# Patient Record
Sex: MALE | Race: WHITE | Employment: PART TIME | URBAN - METROPOLITAN AREA
[De-identification: names, ages, dates, MRNs, and addresses within clinical notes are randomized per-mention and may not be internally consistent; named-entity substitution may affect disease eponyms.]

---

## 2020-11-04 ENCOUNTER — APPOINTMENT (OUTPATIENT)
Dept: GENERAL RADIOLOGY | Age: 35
End: 2020-11-04
Attending: NURSE PRACTITIONER
Payer: MEDICAID

## 2020-11-04 ENCOUNTER — HOSPITAL ENCOUNTER (EMERGENCY)
Age: 35
Discharge: HOME | End: 2020-11-04
Payer: MEDICAID

## 2020-11-04 VITALS
WEIGHT: 185 LBS | BODY MASS INDEX: 27.4 KG/M2 | DIASTOLIC BLOOD PRESSURE: 87 MMHG | SYSTOLIC BLOOD PRESSURE: 145 MMHG | HEART RATE: 77 BPM | OXYGEN SATURATION: 100 % | RESPIRATION RATE: 16 BRPM | HEIGHT: 69 IN | TEMPERATURE: 97.9 F

## 2020-11-04 DIAGNOSIS — S02.2XXA CLOSED FRACTURE OF NASAL BONE, INITIAL ENCOUNTER: Primary | ICD-10-CM

## 2020-11-04 PROCEDURE — 99282 EMERGENCY DEPT VISIT SF MDM: CPT

## 2020-11-04 PROCEDURE — 70160 X-RAY EXAM OF NASAL BONES: CPT

## 2020-11-04 RX ORDER — HYDROCODONE BITARTRATE AND ACETAMINOPHEN 5; 325 MG/1; MG/1
1 TABLET ORAL
Qty: 10 TAB | Refills: 0 | Status: SHIPPED | OUTPATIENT
Start: 2020-11-04 | End: 2020-11-07
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
## 2020-11-04 NOTE — ED TRIAGE NOTES
Pt states he was wrestling around 3 days pta and was elbowed in the nose, complains of pain to the nose since then, states some bleeding yesterday however none since

## 2020-11-04 NOTE — Clinical Note
[address] [place] [address] EMERGENCY DEPT 
[facility] [address] [id number] 
[phone] Work/School Note Date: 11/4/2020 To Whom It May concern: 
 
 
Hood Nuno was seen and treated today in the emergency room by the following provider(s): 
Nurse Practitioner: Ansley Marquez NP. Hood Nuno is excused from work/school on 11/04/20. He is clear to return to work/school on 11/05/20. Sincerely, Nirali Lowe NP

## 2020-11-04 NOTE — ED NOTES
[facility] 15 all medications, side effects and follow up explained with pt's verbal understanding given.  All personal belongings taken

## 2020-11-06 ENCOUNTER — OFFICE VISIT (OUTPATIENT)
Dept: ENT CLINIC | Age: 35
End: 2020-11-06
Payer: MEDICAID

## 2020-11-06 VITALS
TEMPERATURE: 97.8 F | HEART RATE: 74 BPM | SYSTOLIC BLOOD PRESSURE: 138 MMHG | OXYGEN SATURATION: 98 % | DIASTOLIC BLOOD PRESSURE: 80 MMHG

## 2020-11-06 DIAGNOSIS — R51.9 ACUTE NONINTRACTABLE HEADACHE, UNSPECIFIED HEADACHE TYPE: ICD-10-CM

## 2020-11-06 DIAGNOSIS — S02.2XXA CLOSED FRACTURE OF NASAL BONE, INITIAL ENCOUNTER: Primary | ICD-10-CM

## 2020-11-06 PROCEDURE — 99203 OFFICE O/P NEW LOW 30 MIN: CPT | Performed by: OTOLARYNGOLOGY

## 2020-11-06 NOTE — PROGRESS NOTES
Subjective:    [place] Code   35 y.o.   1985     HPI     Location - nose, head    Quality - nasal fracture, headache    Severity -  moderate    Duration - 5 days    Timing - ongoing    Context - pt suffered acute nasal trauma wrestling with his son, 5 days ago, had right epistaxis, some bruising; still co headaches but no congestion; does not c/o appearance of nose    Modifying Features - ice helps    Associated symptoms/signs - headache       Review of Systems  Review of Systems   Constitutional: Negative for chills and fever. HENT: Positive for nosebleeds. Negative for ear pain, hearing loss and tinnitus. Eyes: Negative for blurred vision and double vision. Respiratory: Negative for cough, sputum production and shortness of breath. Cardiovascular: Negative for chest pain and palpitations. Gastrointestinal: Negative for heartburn, nausea and vomiting. Musculoskeletal: Negative for joint pain and neck pain. Skin: Negative. Neurological: Positive for headaches. Negative for dizziness, speech change and weakness. Endo/Heme/Allergies: Negative for environmental allergies. Does not bruise/bleed easily. Psychiatric/Behavioral: Negative for memory loss. The patient does not have insomnia. History reviewed. No pertinent past medical history. History reviewed. No pertinent surgical history. History reviewed. No pertinent family history. Social History     Tobacco Use    Smoking status: Current Every Day Smoker    Smokeless tobacco: Never Used   Substance Use Topics    Alcohol use: Yes     Comment: occasionally      Prior to Admission medications    Medication Sig Start Date End Date Taking? Authorizing Provider   HYDROcodone-acetaminophen (Norco) 5-325 mg per tablet Take 1 Tab by mouth every six (6) hours as needed for Pain for up to 3 days. Max Daily Amount: 4 Tabs.  11/4/20 11/7/20  Severiano Dearth, NP        No Known Allergies      Objective:     Visit Vitals  /80 (BP 1 Location: Right arm, BP Patient Position: Sitting)   Pulse 74   Temp 97.8 °F (36.6 °C) (Temporal)   SpO2 98%        Physical Exam  Vitals signs reviewed. Constitutional:       General: He is awake. Appearance: Normal appearance. He is normal weight. HENT:      Head: Normocephalic. Abrasion and contusion present. Jaw: There is normal jaw occlusion. No trismus, tenderness or malocclusion. Salivary Glands: Right salivary gland is not diffusely enlarged or tender. Left salivary gland is not diffusely enlarged or tender. Right Ear: Hearing, tympanic membrane, ear canal and external ear normal.      Left Ear: Hearing, tympanic membrane, ear canal and external ear normal.      Ears:      Marino exam findings: does not lateralize. Right Rinne: AC > BC. Left Rinne: AC > BC. Nose: Mucosal edema present. No septal deviation or rhinorrhea. Right Turbinates: Not enlarged, swollen or pale. Left Turbinates: Not enlarged, swollen or pale. Right Sinus: No maxillary sinus tenderness or frontal sinus tenderness. Left Sinus: No maxillary sinus tenderness or frontal sinus tenderness. Comments: Mild dorsal hump from edema  Small abrasion midline dorsum     Mouth/Throat:      Lips: Pink. Mouth: Mucous membranes are moist. No oral lesions. Dentition: Normal dentition. No gum lesions. Tongue: No lesions. Palate: No mass and lesions. Pharynx: Oropharynx is clear. Uvula midline. Tonsils: No tonsillar exudate. 0 on the right. 0 on the left. Eyes:      General: Vision grossly intact. Extraocular Movements: Extraocular movements intact. Right eye: No nystagmus. Left eye: No nystagmus. Pupils: Pupils are equal, round, and reactive to light. Neck:      Musculoskeletal: Normal range of motion. No edema or erythema. Thyroid: No thyroid mass, thyromegaly or thyroid tenderness.       Trachea: Trachea and phonation normal. No tracheal tenderness. Cardiovascular:      Rate and Rhythm: Normal rate and regular rhythm. Pulmonary:      Effort: Pulmonary effort is normal.      Breath sounds: Normal breath sounds. No stridor. No wheezing. Musculoskeletal: Normal range of motion. Lymphadenopathy:      Cervical: No cervical adenopathy. Skin:     General: Skin is warm and dry. Neurological:      General: No focal deficit present. Mental Status: He is alert and oriented to person, place, and time. Mental status is at baseline. Cranial Nerves: Cranial nerves are intact. Coordination: Romberg sign negative. Gait: Gait is intact. Comments: Negative Hallpike   Psychiatric:         Mood and Affect: Mood normal.         Behavior: Behavior normal. Behavior is cooperative. Assessment/Plan:     Encounter Diagnoses   Name Primary?  Closed fracture of nasal bone, initial encounter Yes    Acute nonintractable headache, unspecified headache type      I reviewed his xrays from [place]  There is a small nasal tip fx  No lateral bone fx noted  He does not require closed reduction  Recommend Ice, advil    No orders of the defined types were placed in this encounter. Follow-up and Dispositions    · Return if symptoms worsen or fail to improve.

## 2021-12-10 ENCOUNTER — OFFICE VISIT (OUTPATIENT)
Dept: FAMILY MEDICINE CLINIC | Age: 36
End: 2021-12-10
Payer: MEDICAID

## 2021-12-10 VITALS
WEIGHT: 218 LBS | HEIGHT: 69 IN | HEART RATE: 76 BPM | TEMPERATURE: 97.7 F | DIASTOLIC BLOOD PRESSURE: 89 MMHG | OXYGEN SATURATION: 97 % | BODY MASS INDEX: 32.29 KG/M2 | SYSTOLIC BLOOD PRESSURE: 140 MMHG

## 2021-12-10 DIAGNOSIS — R06.02 SHORTNESS OF BREATH: ICD-10-CM

## 2021-12-10 DIAGNOSIS — Z13.0 SCREENING FOR DEFICIENCY ANEMIA: ICD-10-CM

## 2021-12-10 DIAGNOSIS — Z13.220 SCREENING FOR LIPID DISORDERS: ICD-10-CM

## 2021-12-10 DIAGNOSIS — Z13.1 SCREENING FOR DIABETES MELLITUS: ICD-10-CM

## 2021-12-10 DIAGNOSIS — Z11.59 ENCOUNTER FOR HEPATITIS C SCREENING TEST FOR LOW RISK PATIENT: ICD-10-CM

## 2021-12-10 DIAGNOSIS — Z13.21 ENCOUNTER FOR VITAMIN DEFICIENCY SCREENING: ICD-10-CM

## 2021-12-10 DIAGNOSIS — R03.0 ELEVATED BLOOD-PRESSURE READING, WITHOUT DIAGNOSIS OF HYPERTENSION: Primary | ICD-10-CM

## 2021-12-10 PROCEDURE — 99204 OFFICE O/P NEW MOD 45 MIN: CPT | Performed by: NURSE PRACTITIONER

## 2021-12-10 RX ORDER — ALBUTEROL SULFATE 90 UG/1
1 AEROSOL, METERED RESPIRATORY (INHALATION)
Qty: 18 G | Refills: 1 | Status: SHIPPED | OUTPATIENT
Start: 2021-12-10

## 2021-12-10 NOTE — PROGRESS NOTES
Chief Complaint   Patient presents with    New Patient     hasnt seen a dr in a while.  Other     c/o being dx with COVID twice. Since then he has been having chest pain. Visit Vitals  BP (!) 140/89 (BP 1 Location: Left upper arm, BP Patient Position: Sitting)   Pulse 76   Temp 97.7 °F (36.5 °C) (Temporal)   Ht 5' 9\" (1.753 m)   Wt 218 lb (98.9 kg)   SpO2 97%   BMI 32.19 kg/m²     Subjective  Irma Kat is a 31 y.o. male. HPI:  Pt presented to establish. He stated it has been awhile since he saw a medical provider. Had Covid 19 x 2 and since then has been experiencing chest pain in center of his chest and hacking up yellowish grey sputum. The coughing is not prolonged. No fever or chills. Sometimes has SOB and can hear wheezing w/ the cough. B/P in office elevated @ 140/89. He has no idea what his B/P might run outside of a medical office. He does not have a B/P kit at home to check. Pt stated that he has a torn L rotator cuff but has not pursued treatment at this time. Pt stated that he used to be a smoke and smoked 1.5 packs daily x 17 years. Quit date was 8/5/2021. He has never seen a pulmonologist or been checked for COPD. He has not had routine labs done in awhile so will order today. Patient Active Problem List   Diagnosis Code    Elevated blood-pressure reading, without diagnosis of hypertension R03.0    Shortness of breath R06.02     History reviewed. No pertinent past medical history.   Past Surgical History:   Procedure Laterality Date    HX ORTHOPAEDIC Left     ankle due to MRSA infection     Current Outpatient Medications   Medication Instructions    albuterol (PROVENTIL HFA, VENTOLIN HFA, PROAIR HFA) 90 mcg/actuation inhaler 1 Puff, Inhalation, EVERY 4-6 HOURS PRN     No Known Allergies  Social History     Tobacco Use    Smoking status: Former Smoker     Packs/day: 1.50     Years: 17.00     Pack years: 25.50     Types: Cigarettes     Quit date: 8/5/2021     Years since quittin.3    Smokeless tobacco: Never Used   Vaping Use    Vaping Use: Every day    Substances: Using disposable nicotine e-cigarettes. Substance Use Topics    Alcohol use: Yes     Comment: occasionally    Drug use: Not on file     Family History   Problem Relation Age of Onset    Cancer Mother         cervical    Hypertension Father     Hypertension Maternal Aunt     Hypertension Maternal Grandmother     Cancer Maternal Grandfather         unsure of type       Review of Systems   Constitutional: Negative for chills and fever. HENT: Negative for ear pain and sore throat. Respiratory: Positive for cough, shortness of breath and wheezing. Cardiovascular: Positive for chest pain. Center of chest   Gastrointestinal: Negative for abdominal pain, blood in stool, constipation, diarrhea, heartburn, nausea and vomiting. Genitourinary: Negative for dysuria. Musculoskeletal: Positive for back pain and joint pain. Negative for myalgias. L torn rotator cuff. Neurological: Negative for dizziness, tingling, sensory change and headaches. Endo/Heme/Allergies: Positive for environmental allergies. Psychiatric/Behavioral: Negative for depression. The patient is nervous/anxious. The patient does not have insomnia. Objective  Physical Exam  Vitals reviewed. Constitutional:       General: He is not in acute distress. Appearance: Normal appearance. HENT:      Head: Normocephalic. Right Ear: External ear normal.      Left Ear: External ear normal.      Nose: Nose normal.   Eyes:      Conjunctiva/sclera: Conjunctivae normal.   Neck:      Vascular: No carotid bruit. Cardiovascular:      Rate and Rhythm: Normal rate and regular rhythm. Heart sounds: Normal heart sounds. No murmur heard. Pulmonary:      Effort: Pulmonary effort is normal. No respiratory distress. Breath sounds: Normal breath sounds.    Musculoskeletal:         General: No swelling or tenderness. Normal range of motion. Cervical back: Neck supple. Right lower leg: No edema. Left lower leg: No edema. Skin:     General: Skin is warm and dry. Coloration: Skin is not jaundiced. Findings: No lesion or rash. Neurological:      Mental Status: He is alert and oriented to person, place, and time. Motor: No weakness. Gait: Gait normal.   Psychiatric:         Mood and Affect: Mood normal.         Behavior: Behavior normal.         Thought Content: Thought content normal.         Judgment: Judgment normal.       Assessment & Plan      ICD-10-CM ICD-9-CM    1. Elevated blood-pressure reading, without diagnosis of hypertension  R03.0 796.2 AMB SUPPLY ORDER   2. Shortness of breath  R06.02 786.05 albuterol (PROVENTIL HFA, VENTOLIN HFA, PROAIR HFA) 90 mcg/actuation inhaler   3. Screening for diabetes mellitus  I59.1 Q36.2 METABOLIC PANEL, COMPREHENSIVE      HEMOGLOBIN A1C WITH EAG   4. Screening for lipid disorders  Z13.220 V77.91 LIPID PANEL   5. Encounter for vitamin deficiency screening  Z13.21 V77.99 VITAMIN D, 25 HYDROXY   6. Screening for deficiency anemia  Z13.0 V78.1 CBC WITH AUTOMATED DIFF   7. Encounter for hepatitis C screening test for low risk patient  Z11.59 V73.89 HCV AB W/RFLX TO HUGH       1. Elevated blood-pressure reading, without diagnosis of hypertension  Pt advised that once he receives the B/P kit to check his B/P daily w/ goal < 130/80. If not at goal, he should notify office. May need to start B/P medication.    - AMB SUPPLY ORDER    2. Shortness of breath  ? Long Covid Syndrome or early COPD? Can consider a referral to pulmonary for evaluation. - albuterol (PROVENTIL HFA, VENTOLIN HFA, PROAIR HFA) 90 mcg/actuation inhaler; Take 1 Puff by inhalation every four to six (4-6) hours as needed for Wheezing or Shortness of Breath. Dispense: 18 g; Refill: 1    3.  Screening for diabetes mellitus    - METABOLIC PANEL, COMPREHENSIVE  - HEMOGLOBIN A1C WITH EAG    4. Screening for lipid disorders    - LIPID PANEL    5. Encounter for vitamin deficiency screening  Likely he is deficient in Vit D as difficult for some individuals to get enough sunlight or food that is fortified or a natural source of Vit D.     - VITAMIN D, 25 HYDROXY    6. Screening for deficiency anemia    - CBC WITH AUTOMATED DIFF    7. Encounter for hepatitis C screening test for low risk patient  One time recommended screening for pt in his age group per new guidelines. - HCV AB W/RFLX TO HUGH     I have discussed the diagnosis with the patient and the intended plan as seen in the above orders. Pt/caretaker has expressed understanding. Questions were answered concerning future plans. I have discussed medication side effects and warnings as indicated with the patient as well.     Chanda Treadwell, RAUL

## 2021-12-10 NOTE — PROGRESS NOTES
Chief Complaint   Patient presents with    New Patient     hasnt seen a dr in a while.  Other     c/o being dx with COVID twice. Since then he has been having chest pain.      Visit Vitals  BP (!) 142/87 (BP 1 Location: Left upper arm, BP Patient Position: Sitting)   Pulse 82   Temp 97.7 °F (36.5 °C) (Temporal)   Ht 5' 9\" (1.753 m)   Wt 218 lb (98.9 kg)   SpO2 97%   BMI 32.19 kg/m²

## 2021-12-17 LAB
25(OH)D3+25(OH)D2 SERPL-MCNC: 11.9 NG/ML (ref 30–100)
ALBUMIN SERPL-MCNC: 4.3 G/DL (ref 4–5)
ALBUMIN/GLOB SERPL: 2 {RATIO} (ref 1.2–2.2)
ALP SERPL-CCNC: 67 IU/L (ref 44–121)
ALT SERPL-CCNC: 22 IU/L (ref 0–44)
AST SERPL-CCNC: 16 IU/L (ref 0–40)
BASOPHILS # BLD AUTO: 0.1 X10E3/UL (ref 0–0.2)
BASOPHILS NFR BLD AUTO: 1 %
BILIRUB SERPL-MCNC: 0.4 MG/DL (ref 0–1.2)
BUN SERPL-MCNC: 12 MG/DL (ref 6–20)
BUN/CREAT SERPL: 13 (ref 9–20)
CALCIUM SERPL-MCNC: 9.1 MG/DL (ref 8.7–10.2)
CHLORIDE SERPL-SCNC: 103 MMOL/L (ref 96–106)
CHOLEST SERPL-MCNC: 204 MG/DL (ref 100–199)
CO2 SERPL-SCNC: 23 MMOL/L (ref 20–29)
CREAT SERPL-MCNC: 0.91 MG/DL (ref 0.76–1.27)
EOSINOPHIL # BLD AUTO: 0.4 X10E3/UL (ref 0–0.4)
EOSINOPHIL NFR BLD AUTO: 7 %
ERYTHROCYTE [DISTWIDTH] IN BLOOD BY AUTOMATED COUNT: 12.5 % (ref 11.6–15.4)
EST. AVERAGE GLUCOSE BLD GHB EST-MCNC: 105 MG/DL
GLOBULIN SER CALC-MCNC: 2.2 G/DL (ref 1.5–4.5)
GLUCOSE SERPL-MCNC: 93 MG/DL (ref 65–99)
HBA1C MFR BLD: 5.3 % (ref 4.8–5.6)
HCT VFR BLD AUTO: 49.4 % (ref 37.5–51)
HCV AB S/CO SERPL IA: <0.1 S/CO RATIO (ref 0–0.9)
HCV AB SERPL QL IA: NORMAL
HDLC SERPL-MCNC: 45 MG/DL
HGB BLD-MCNC: 16.8 G/DL (ref 13–17.7)
IMM GRANULOCYTES # BLD AUTO: 0 X10E3/UL (ref 0–0.1)
IMM GRANULOCYTES NFR BLD AUTO: 1 %
LDLC SERPL CALC-MCNC: 138 MG/DL (ref 0–99)
LYMPHOCYTES # BLD AUTO: 2 X10E3/UL (ref 0.7–3.1)
LYMPHOCYTES NFR BLD AUTO: 33 %
MCH RBC QN AUTO: 31.5 PG (ref 26.6–33)
MCHC RBC AUTO-ENTMCNC: 34 G/DL (ref 31.5–35.7)
MCV RBC AUTO: 93 FL (ref 79–97)
MONOCYTES # BLD AUTO: 0.6 X10E3/UL (ref 0.1–0.9)
MONOCYTES NFR BLD AUTO: 10 %
NEUTROPHILS # BLD AUTO: 2.9 X10E3/UL (ref 1.4–7)
NEUTROPHILS NFR BLD AUTO: 48 %
PLATELET # BLD AUTO: 230 X10E3/UL (ref 150–450)
POTASSIUM SERPL-SCNC: 4.1 MMOL/L (ref 3.5–5.2)
PROT SERPL-MCNC: 6.5 G/DL (ref 6–8.5)
RBC # BLD AUTO: 5.33 X10E6/UL (ref 4.14–5.8)
SODIUM SERPL-SCNC: 140 MMOL/L (ref 134–144)
TRIGL SERPL-MCNC: 114 MG/DL (ref 0–149)
VLDLC SERPL CALC-MCNC: 21 MG/DL (ref 5–40)
WBC # BLD AUTO: 5.9 X10E3/UL (ref 3.4–10.8)

## 2021-12-21 DIAGNOSIS — E55.9 VITAMIN D DEFICIENCY: Primary | ICD-10-CM

## 2021-12-21 PROBLEM — R03.0 ELEVATED BLOOD-PRESSURE READING, WITHOUT DIAGNOSIS OF HYPERTENSION: Status: ACTIVE | Noted: 2021-12-21

## 2021-12-21 PROBLEM — R06.02 SHORTNESS OF BREATH: Status: ACTIVE | Noted: 2021-12-21

## 2021-12-21 PROBLEM — F41.9 ANXIETY: Status: ACTIVE | Noted: 2021-12-21

## 2021-12-21 RX ORDER — ERGOCALCIFEROL 1.25 MG/1
50000 CAPSULE ORAL
Qty: 12 CAPSULE | Refills: 0 | Status: SHIPPED | OUTPATIENT
Start: 2021-12-21

## 2021-12-21 NOTE — PROGRESS NOTES
Pt was notified via [employer] about low Vit D and need to start high dose supplement and order to be sent to his pharmacy.

## 2022-11-28 ENCOUNTER — TELEPHONE (OUTPATIENT)
Dept: FAMILY MEDICINE CLINIC | Age: 37
End: 2022-11-28
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
## 2022-11-30 NOTE — TELEPHONE ENCOUNTER
I am unable to review the Emergency note so it is difficult for me to make any kind of recommendation.

## 2023-01-20 ENCOUNTER — APPOINTMENT (OUTPATIENT)
Dept: GENERAL RADIOLOGY | Age: 38
End: 2023-01-20
Attending: STUDENT IN AN ORGANIZED HEALTH CARE EDUCATION/TRAINING PROGRAM
Payer: OTHER MISCELLANEOUS

## 2023-01-20 ENCOUNTER — HOSPITAL ENCOUNTER (EMERGENCY)
Age: 38
Discharge: STILL ADMITTED OR ADMINISTRATIVE | End: 2023-01-20

## 2023-01-20 ENCOUNTER — HOSPITAL ENCOUNTER (EMERGENCY)
Age: 38
Discharge: HOME | End: 2023-01-20
Attending: STUDENT IN AN ORGANIZED HEALTH CARE EDUCATION/TRAINING PROGRAM | Admitting: STUDENT IN AN ORGANIZED HEALTH CARE EDUCATION/TRAINING PROGRAM
Payer: OTHER MISCELLANEOUS

## 2023-01-20 VITALS
RESPIRATION RATE: 17 BRPM | WEIGHT: 235 LBS | HEIGHT: 69 IN | DIASTOLIC BLOOD PRESSURE: 84 MMHG | HEART RATE: 88 BPM | TEMPERATURE: 97.7 F | SYSTOLIC BLOOD PRESSURE: 132 MMHG | OXYGEN SATURATION: 100 % | BODY MASS INDEX: 34.8 KG/M2

## 2023-01-20 DIAGNOSIS — S61.327A: Primary | ICD-10-CM

## 2023-01-20 PROCEDURE — 75810000293 HC SIMP/SUPERF WND  RPR

## 2023-01-20 PROCEDURE — 73140 X-RAY EXAM OF FINGER(S): CPT

## 2023-01-20 PROCEDURE — 99283 EMERGENCY DEPT VISIT LOW MDM: CPT

## 2023-01-20 RX ORDER — LIDOCAINE HYDROCHLORIDE 10 MG/ML
10 INJECTION INFILTRATION; PERINEURAL ONCE
Status: DISCONTINUED | OUTPATIENT
Start: 2023-01-20 | End: 2023-01-20 | Stop reason: HOSPADM

## 2023-01-20 RX ORDER — LIDOCAINE HYDROCHLORIDE 10 MG/ML
10 INJECTION INFILTRATION; PERINEURAL ONCE
Status: DISCONTINUED | OUTPATIENT
Start: 2023-01-20 | End: 2023-01-20 | Stop reason: CLARIF

## 2023-01-20 NOTE — Clinical Note
[facility] EMERGENCY DEPT  [address] [place] [id number]  [phone]    Work/School Note    Date: 1/20/2023    To Whom It May concern:      Emily Spencer was seen and treated today in the emergency room by the following provider(s):  Attending Provider: Zoltan Morejon MD.      Emily Spencer is excused from work/school on 01/20/23. He is clear to return to work/school on 01/21/23.         Sincerely,          Fito Freeman MD

## 2023-01-20 NOTE — DISCHARGE INSTRUCTIONS
Thank you! Thank you for allowing me to care for you in the emergency department. I sincerely hope that you are satisfied with your visit today. It is my goal to provide you with excellent care. Below you will find a list of your labs and imaging from your visit today if applicable. Should you have any questions regarding these results please do not hesitate to call the emergency department. Please review [employer] for a more detailed result list since the below list may not be comprehensive. Instructions on how to sign up to [employer] should be provided in this packet. Labs -   No results found for this or any previous visit (from the past 12 hour(s)). Radiologic Studies -   XR 5TH FINGER LT MIN 2 V   Final Result   No persistent foreign body. XR 5TH FINGER LT MIN 2 V   Final Result   Soft tissue laceration and associated 1 mm radiopaque foreign body. No underlying fracture. CT Results  (Last 48 hours)      None          CXR Results  (Last 48 hours)      None               If you feel that you have not received excellent quality care or timely care, please ask to speak to the nurse manager. Please choose us in the future for your continued health care needs. ------------------------------------------------------------------------------------------------------------  The exam and treatment you received in the Emergency Department were for an urgent problem and are not intended as complete care. It is very important that you follow-up with a doctor, nurse practitioner, or physician assistant in a timely manner to:  (1) confirm your diagnosis and review all imaging and lab results,  (2) re-evaluation of changes in your illness and treatment, and  (3) for ongoing care. If your symptoms become worse or you do not improve as expected and you are unable to reach your usual health care provider, you should return to the Emergency Department. We are available 24 hours a day.      Please take your discharge instructions with you when you go to your follow-up appointment. If a prescription has been provided, please have it filled as soon as possible to prevent a delay in treatment. Read the entire medication instruction sheet provided to you by the pharmacy. If you have any questions or reservations about taking the medication due to side effects or interactions with other medications, please call your primary care physician or contact the ER to speak with the charge nurse. Make an appointment with your family doctor or the physician you were referred to for follow-up of this visit as instructed on your discharge paperwork, as this is a mandatory follow-up. Return to the ER if you are unable to be seen or if you are unable to be seen in a timely manner. If you have any problem arranging the follow-up visit, contact the Emergency Department immediately.

## 2023-01-20 NOTE — ED PROVIDER NOTES
[facility] EMERGENCY DEPT  EMERGENCY DEPARTMENT HISTORY AND PHYSICAL EXAM      Date: 2023  Patient Name: Bonnie Saunders  MRN: [id number]  [place]: 1985  Date of evaluation: 2023  Provider: Parminder Brown MD   Note Started: 7:48 AM 23    HISTORY OF PRESENT ILLNESS     Chief Complaint   Patient presents with    Laceration       History Provided By: Patient    HPI: Bonnie Saunders, 30 y.o. male with no significant past history presenting to the ED for left fifth pinky laceration. Patient states he was at work when a metal sheet hit his pinky. He is up-to-date on tetanus. Bleeding controlled at the scene. PAST MEDICAL HISTORY   Past Medical History:  No past medical history on file. Past Surgical History:  Past Surgical History:   Procedure Laterality Date    HX ORTHOPAEDIC Left     ankle due to MRSA infection       Family History:  Family History   Problem Relation Age of Onset    Cancer Mother         cervical    Hypertension Father     Hypertension Maternal Aunt     Hypertension Maternal Grandmother     Cancer Maternal Grandfather         unsure of type       Social History:  Social History     Tobacco Use    Smoking status: Former     Packs/day: 1.50     Years: 17.00     Pack years: 25.50     Types: Cigarettes     Quit date: 2021     Years since quittin.4    Smokeless tobacco: Never   Vaping Use    Vaping Use: Every day    Substances: Using disposable nicotine e-cigarettes. Substance Use Topics    Alcohol use: Yes     Comment: occasionally       Allergies:  No Known Allergies    PCP: None    Current Meds:   Discharge Medication List as of 2023  9:29 AM        CONTINUE these medications which have NOT CHANGED    Details   ergocalciferol (ERGOCALCIFEROL) 1,250 mcg (50,000 unit) capsule Take 1 Capsule by mouth every seven (7) days. , Normal, Disp-12 Capsule, R-0      albuterol (PROVENTIL HFA, VENTOLIN HFA, PROAIR HFA) 90 mcg/actuation inhaler Take 1 Puff by inhalation every four to six (4-6) hours as needed for Wheezing or Shortness of Breath., Normal, Disp-18 g, R-1             REVIEW OF SYSTEMS   Review of Systems  Positives and Pertinent negatives as per HPI. PHYSICAL EXAM     ED Triage Vitals [01/20/23 0721]   ED Encounter Vitals Group      /81      Pulse (Heart Rate) 92      Resp Rate 17      Temp 97.7 °F (36.5 °C)      Temp src       O2 Sat (%) 98 %      Weight 235 lb      Height 5' 9\"      Physical Exam   Left fifth pinky with laceration on the lateral side at the PIP joint, there is full digital motion of the left pinky with normal sensation and normal cap refill. Normal ligament exam    ED COURSE and DIFFERENTIAL DIAGNOSIS/MDM   Records Reviewed (source and summary of external notes): Nursing Notes    Vitals:    Vitals:    01/20/23 0721 01/20/23 0935   BP: 137/81 132/84   Pulse: 92 88   Resp: 17    Temp: 97.7 °F (36.5 °C)    SpO2: 98% 100%   Weight: 106.6 kg (235 lb)    Height: 5' 9\" (1.753 m)        CC/HPI Summary, DDx, ED Course, and Reassessment: 46-year-old male presenting to the ED with a left fifth finger laceration. No need for tetanus. Tendons intact, ligaments pending examination under anesthesia. Laceration will require stitches and hand surgery follow-up. Will obtain x-ray to eval for fracture. X-ray shows a radiopaque foreign body. ,  The wound was numbed and irrigated extensively sensibly, repeat x-ray shows removal of foreign body. Sutures placed and patient discharged with recommendation to follow-up with hand surgery. He was given return precautions for swelling, redness, fever or any other additional symptoms.   Patient was given the following medications:  Medications   lidocaine (XYLOCAINE) 10 mg/mL (1 %) injection 10 mL (has no administration in time range)       CONSULTS: (Who and What was discussed)  None       SCREENINGS               No data recorded        LAB, EKG AND DIAGNOSTIC RESULTS   Labs:  No results found for this or any previous visit (from the past 12 hour(s)). Radiologic Studies:  Non-plain film images such as CT, Ultrasound and MRI are read by the radiologist. Plain radiographic images are visualized and preliminarily interpreted by the ED Provider with the below findings:    Initial x-ray does not show fracture but shows foreign body, repeat x-ray shows no longer foreign body present. Interpretation per the Radiologist below, if available at the time of this note:  XR 5TH FINGER LT MIN 2 V    Result Date: 1/20/2023  EXAM: XR 5TH FINGER LT MIN 2 V, 0842 hours. INDICATION: Foreign body. repeat. COMPARISON: 0751 hours FINDINGS: Three views of the left fifth finger demonstrate no fracture or foreign body. No persistent foreign body. XR 5TH FINGER LT MIN 2 V    Result Date: 1/20/2023  EXAM: XR 5TH FINGER LT MIN 2 V INDICATION: eval fx, fb - has lac. COMPARISON: September 2014 FINDINGS: Three views of the left fifth finger demonstrate no fracture or other acute osseous or articular  abnormality. There is a soft tissue laceration and associated 1 mm radiopaque foreign body overlying the fifth digit at the level of the PIP joint. Soft tissue laceration and associated 1 mm radiopaque foreign body. No underlying fracture. PROCEDURES   Unless otherwise noted below, none. Performed by: Amy Hayes MD   Procedures    Procedure Note - Laceration Repair:  10:44 AM  Procedure by Self  Complexity: simple   1cm linear laceration to ring finger  was irrigated copiously with NS under jet lavage, prepped with Chlorprep and draped in a sterile fashion. The area was anesthetized via local infiltration of 5 mL lidocaine 1% without epinephrine. The wound was explored with the following results: Foreign bodies found and removed. The wound was repaired with One layer suture closure: Skin Layer:  5 sutures placed, stitch type:simple interrupted, suture: 4-0 polypropylene. .  The wound was closed with good hemostasis and approximation. Sterile dressing applied. Estimated blood loss: minimal  The procedure took 1-15 minutes, and pt tolerated well. CRITICAL CARE TIME       FINAL IMPRESSION     1. Laceration of left little finger with foreign body and damage to nail, initial encounter          DISPOSITION/PLAN   Discharged    Discharge Note: The patient is stable for discharge home. The signs, symptoms, diagnosis, and discharge instructions have been discussed, understanding conveyed, and agreed upon. The patient is to follow up as recommended or return to ER should their symptoms worsen. PATIENT REFERRED TO:  Follow-up Information       Follow up With Specialties Details Why Contact Info    Nena Rizzo MD Orthopedic Surgery Call in 1 day  [phone] [employer] [id number] [phone]                DISCHARGE MEDICATIONS:  Discharge Medication List as of 1/20/2023  9:29 AM            DISCONTINUED MEDICATIONS:  Discharge Medication List as of 1/20/2023  9:29 AM          I am the Primary Clinician of Record: Alyx Mckeon MD (electronically signed)    (Please note that parts of this dictation were completed with voice recognition software. Quite often unanticipated grammatical, syntax, homophones, and other interpretive errors are inadvertently transcribed by the computer software. Please disregards these errors.  Please excuse any errors that have escaped final proofreading.)